# Patient Record
Sex: FEMALE | Race: WHITE
[De-identification: names, ages, dates, MRNs, and addresses within clinical notes are randomized per-mention and may not be internally consistent; named-entity substitution may affect disease eponyms.]

---

## 2018-02-12 ENCOUNTER — HOSPITAL ENCOUNTER (EMERGENCY)
Dept: HOSPITAL 31 - C.ER | Age: 68
Discharge: HOME | End: 2018-02-12
Payer: MEDICARE

## 2018-02-12 VITALS — TEMPERATURE: 98 F | RESPIRATION RATE: 18 BRPM

## 2018-02-12 VITALS — OXYGEN SATURATION: 99 % | HEART RATE: 75 BPM | DIASTOLIC BLOOD PRESSURE: 76 MMHG | SYSTOLIC BLOOD PRESSURE: 128 MMHG

## 2018-02-12 DIAGNOSIS — M25.512: Primary | ICD-10-CM

## 2018-02-12 PROCEDURE — 99284 EMERGENCY DEPT VISIT MOD MDM: CPT

## 2018-02-12 PROCEDURE — 96372 THER/PROPH/DIAG INJ SC/IM: CPT

## 2018-02-12 NOTE — C.PDOC
History Of Present Illness


68yo female, presents to ER with complaints of left shoulder pain which is 

chronic. Patient states she usually receives steroid injection in her left 

shoulder for pain control; states she was last at her PMD 3 days ago and was 

given an IM injection. She states pain has recurred but denies any new trauma, 

numbness or weakness. She has no other medical complaints. 


Time Seen by Provider: 02/12/18 04:25


Chief Complaint (Nursing): Upper Extremity Problem/Injury


History Per: Patient


History/Exam Limitations: no limitations


Onset/Duration Of Symptoms: Persistent


Current Symptoms Are (Timing): Still Present


Quality: "Pain"


Exacerbating Factor(s): Strenuous Use Of Affected Area





Past Medical History


Reviewed: Historical Data, Nursing Documentation, Vital Signs


Vital Signs: 


 Last Vital Signs











Temp  98.0 F   02/12/18 03:58


 


Pulse  70   02/12/18 03:58


 


Resp  18   02/12/18 03:58


 


BP  159/93 H  02/12/18 03:58


 


Pulse Ox  100   02/12/18 05:50














- Medical History


PMH: Arthritis, Asthma, Hypercholesterolemia


Surgical History: No Surg Hx


Family History: States: No Known Family Hx





- Social History


Hx Alcohol Use: No


Hx Substance Use: No





- Immunization History


Hx Tetanus Toxoid Vaccination: Yes


Hx Influenza Vaccination: Yes


Hx Pneumococcal Vaccination: Yes





Review Of Systems


Musculoskeletal: Positive for: Shoulder Pain (left shoulder pain)


Neurological: Negative for: Weakness, Numbness





Physical Exam





- Physical Exam


Appears: Non-toxic, No Acute Distress


Skin: Warm


Neck: Normal ROM, Supple


Chest: Symmetrical


Cardiovascular: Rhythm Regular


Extremity: No Normal ROM (Decreased ROM of left shoulder due to pain), 

Tenderness (tenderness note dto left shoulder), Capillary Refill (< 2 seconds), 

No Deformity, No Swelling


Pulses: Left Radial: Normal, Right Radial: Normal


Neurological/Psych: Oriented x3, Normal Speech, Normal Cognition, Normal Motor (

good  strength of bilateral upper extremities), Normal Sensation





ED Course And Treatment


O2 Sat by Pulse Oximetry: 100 (RA)


Pulse Ox Interpretation: Normal


Progress Note: Patient given Toradol and Decadron IM with improvement of pain. 

Stable for discharge home and instructed to follow up with PCP in 1-2 days.  Pt 

understands and agrees with plan


Reevaluation Time: 06:15


Reassessment Condition: Improved





Disposition





- Disposition


Disposition: HOME/ ROUTINE


Disposition Time: 06:15


Condition: STABLE


Prescriptions: 


traMADol [Ultram] 50 mg PO TID #14 tab


Forms:  [employer] (English)





- Clinical Impression


Clinical Impression: 


 Shoulder pain, left








- PA / NP / Resident Statement


MD/DO has reviewed & agrees with the documentation as recorded.





- Scribe Statement


The provider has reviewed the documentation as recorded by the Scribe (Bina Sy)


Provider Scribe Attestation:


All medical record entries made by the Scribe were at my direction and 

personally dictated by me. I have reviewed the chart and agree that the record 

accurately reflects my personal performance of the history, physical exam, 

medical decision making, and the department course for this patient. I have 

also personally directed, reviewed, and agree with the discharge instructions 

and disposition.